# Patient Record
Sex: FEMALE | Race: WHITE | Employment: OTHER | URBAN - METROPOLITAN AREA
[De-identification: names, ages, dates, MRNs, and addresses within clinical notes are randomized per-mention and may not be internally consistent; named-entity substitution may affect disease eponyms.]

---

## 2021-03-02 ENCOUNTER — IMMUNIZATION (OUTPATIENT)
Dept: PRIMARY CARE CLINIC | Age: 86
End: 2021-03-02
Payer: MEDICARE

## 2021-03-02 PROCEDURE — 91301 COVID-19, MODERNA VACCINE 100MCG/0.5ML DOSE: CPT | Performed by: NURSE PRACTITIONER

## 2021-03-02 PROCEDURE — 0011A COVID-19, MODERNA VACCINE 100MCG/0.5ML DOSE: CPT | Performed by: NURSE PRACTITIONER

## 2021-03-30 ENCOUNTER — IMMUNIZATION (OUTPATIENT)
Dept: PRIMARY CARE CLINIC | Age: 86
End: 2021-03-30
Payer: MEDICARE

## 2021-03-30 PROCEDURE — 0012A COVID-19, MODERNA VACCINE 100MCG/0.5ML DOSE: CPT | Performed by: NURSE PRACTITIONER

## 2021-03-30 PROCEDURE — 91301 COVID-19, MODERNA VACCINE 100MCG/0.5ML DOSE: CPT | Performed by: NURSE PRACTITIONER

## 2022-07-03 ENCOUNTER — HOSPITAL ENCOUNTER (EMERGENCY)
Age: 87
Discharge: HOME | End: 2022-07-03
Payer: MEDICARE

## 2022-07-03 VITALS
DIASTOLIC BLOOD PRESSURE: 73 MMHG | SYSTOLIC BLOOD PRESSURE: 130 MMHG | OXYGEN SATURATION: 99 % | RESPIRATION RATE: 18 BRPM | HEIGHT: 63 IN | BODY MASS INDEX: 21.26 KG/M2 | HEART RATE: 81 BPM | TEMPERATURE: 97.7 F | WEIGHT: 120 LBS

## 2022-07-03 DIAGNOSIS — S81.812A SKIN TEAR OF LEFT LOWER LEG WITHOUT COMPLICATION, INITIAL ENCOUNTER: Primary | ICD-10-CM

## 2022-07-03 PROCEDURE — 6360000002 HC RX W HCPCS: Performed by: PHYSICIAN ASSISTANT

## 2022-07-03 PROCEDURE — 99212 OFFICE O/P EST SF 10 MIN: CPT

## 2022-07-03 PROCEDURE — 12002 RPR S/N/AX/GEN/TRNK2.6-7.5CM: CPT

## 2022-07-03 PROCEDURE — 90471 IMMUNIZATION ADMIN: CPT | Performed by: PHYSICIAN ASSISTANT

## 2022-07-03 PROCEDURE — 90715 TDAP VACCINE 7 YRS/> IM: CPT | Performed by: PHYSICIAN ASSISTANT

## 2022-07-03 RX ADMIN — TETANUS TOXOID, REDUCED DIPHTHERIA TOXOID AND ACELLULAR PERTUSSIS VACCINE, ADSORBED 0.5 ML: 5; 2.5; 8; 8; 2.5 SUSPENSION INTRAMUSCULAR at 18:01

## 2022-07-03 ASSESSMENT — PAIN SCALES - GENERAL: PAINLEVEL_OUTOF10: 2

## 2022-07-03 ASSESSMENT — PAIN - FUNCTIONAL ASSESSMENT: PAIN_FUNCTIONAL_ASSESSMENT: 0-10

## 2022-07-03 ASSESSMENT — PAIN DESCRIPTION - DESCRIPTORS: DESCRIPTORS: SORE

## 2022-07-03 NOTE — ED PROVIDER NOTES
[address] [facility]  Department of Emergency Medicine  UC Encounter Note  7/3/22   5:11 PM EDT      NAME: Graciela Correia  :  1926  MRN:  [id number]    Chief Complaint: Laceration (hit leg with botto of car door left lower leg   skin tear )      This is a 55-year-old female the presents to urgent care complaining of a skin tear to the left shin area. She states she was opening up her car door and hit her leg. She denies any other limb injury. She states she is walking fine. She denies any hip knee or ankle injury. She is unsure of her last tetanus and she wants another tetanus shot. On first contact patient she appears to be in no acute distress. She denies any numbness or tingling. Review of Systems  Pertinent positives and negatives are stated within HPI, all other systems reviewed and are negative. Physical Exam  Vitals and nursing note reviewed. Constitutional:       Appearance: She is well-developed. HENT:      Head: Normocephalic and atraumatic. Right Ear: Hearing and external ear normal.      Left Ear: Hearing and external ear normal.      Nose: Nose normal.      Mouth/Throat:      Pharynx: Uvula midline. Eyes:      General: Lids are normal.      Conjunctiva/sclera: Conjunctivae normal.      Pupils: Pupils are equal, round, and reactive to light. Cardiovascular:      Rate and Rhythm: Normal rate and regular rhythm. Heart sounds: Normal heart sounds. No murmur heard. Pulmonary:      Effort: Pulmonary effort is normal.      Breath sounds: Normal breath sounds. Abdominal:      General: Bowel sounds are normal.      Palpations: Abdomen is soft. Abdomen is not rigid. Tenderness: There is no abdominal tenderness. There is no guarding or rebound. Musculoskeletal:      Cervical back: Normal range of motion and neck supple. Comments: Gait is steady. She has no trouble walking.   I do not appreciate any bony pain to her tibia or her knee or ankle area. No hip or upper thigh pain. Skin:     General: Skin is warm and dry. Findings: Wound present. No abrasion or rash. Comments: Approximate 4.5 cm long skin tear mid left shin. Bleeding is controlled. I do not appreciate any bony pain. Neurological:      General: No focal deficit present. Mental Status: She is alert and oriented to person, place, and time. GCS: GCS eye subscore is 4. GCS verbal subscore is 5. GCS motor subscore is 6. Cranial Nerves: No cranial nerve deficit. Sensory: No sensory deficit. Coordination: Coordination normal.      Gait: Gait normal.         Procedures    MDM  Number of Diagnoses or Management Options  Skin tear of left lower leg without complication, initial encounter  Diagnosis management comments: I did clean the site with normal saline. I was able to reapproximate the folded skin back and place it over the skin tear. I did use for Steri-Strips. Wound care instructions were given I did update her tetanus. --------------------------------------------- PAST HISTORY ---------------------------------------------  Past Medical History:  has a past medical history of Arthritis, Hyperlipidemia, Hypertension, and Thyroid disease. Past Surgical History:  has no past surgical history on file. Social History:  reports that she has never smoked. She has never used smokeless tobacco. She reports that she does not drink alcohol and does not use drugs. Family History: family history is not on file. The patients home medications have been reviewed. Allergies: Morphine and Sulfa antibiotics    -------------------------------------------------- RESULTS -------------------------------------------------  No results found for this visit on 07/03/22.   No orders to display       ------------------------- NURSING NOTES AND VITALS REVIEWED ---------------------------   The nursing notes within the ED encounter and vital signs as below have been reviewed. /73   Pulse 81   Temp 97.7 °F (36.5 °C)   Resp 18   Ht 5' 3\" (1.6 m)   Wt 120 lb (54.4 kg)   SpO2 99%   BMI 21.26 kg/m²   Oxygen Saturation Interpretation: Normal      ------------------------------------------ PROGRESS NOTES ------------------------------------------   I have spoken with the patient and discussed todays results, in addition to providing specific details for the plan of care and counseling regarding the diagnosis and prognosis. Their questions are answered at this time and they are agreeable with the plan.      --------------------------------- ADDITIONAL PROVIDER NOTES ---------------------------------     This patient is stable for discharge. I have shared the specific conditions for return, as well as the importance of follow-up. * NOTE: This report was transcribed using voice recognition software. Every effort was made to ensure accuracy; however, inadvertent computerized transcription errors may be present.    --------------------------------- IMPRESSION AND DISPOSITION ---------------------------------    IMPRESSION  1.  Skin tear of left lower leg without complication, initial encounter        DISPOSITION  Disposition: Discharge to home  Patient condition is good            Jeane Martinez PA-C  07/03/22 [id number]

## 2022-07-11 ENCOUNTER — HOSPITAL ENCOUNTER (EMERGENCY)
Age: 87
Discharge: HOME | End: 2022-07-11
Payer: MEDICARE

## 2022-07-11 VITALS
BODY MASS INDEX: 21.26 KG/M2 | SYSTOLIC BLOOD PRESSURE: 147 MMHG | HEART RATE: 68 BPM | OXYGEN SATURATION: 96 % | TEMPERATURE: 97.7 F | DIASTOLIC BLOOD PRESSURE: 74 MMHG | WEIGHT: 120 LBS | RESPIRATION RATE: 18 BRPM | HEIGHT: 63 IN

## 2022-07-11 DIAGNOSIS — Z51.89 VISIT FOR WOUND CHECK: Primary | ICD-10-CM

## 2022-07-11 PROCEDURE — 99211 OFF/OP EST MAY X REQ PHY/QHP: CPT

## 2022-07-11 ASSESSMENT — PAIN - FUNCTIONAL ASSESSMENT: PAIN_FUNCTIONAL_ASSESSMENT: 0-10

## 2022-07-11 ASSESSMENT — PAIN SCALES - GENERAL: PAINLEVEL_OUTOF10: 0

## 2022-07-11 NOTE — ED PROVIDER NOTES
on the left lower extremity Steri-Strips are intact, there is no surrounding erythema there is no drainage there is no signs of infection. · Lymphatic: no lymphadenopathy noted  · Neurologic: GCS 15, no focal deficits,   · Psychiatric: Normal Affect    Lab / Imaging Results   (All laboratory and radiology results have been personally reviewed by myself)  Labs:  No results found for this visit on 07/11/22. Imaging: All Radiology results interpreted by Radiologist unless otherwise noted. No orders to display       ED Course / Medical Decision Making   Medications - No data to display       Consult(s):   None      MDM:   Wound appears to be healing well. The Steri-Strips are intact. I did tell her that she does not really need to have a wrap dressing over it since the Steri-Strips are covering the wound but she does want it reapplied. The nurse did reapply it I did tell her if it falls down again that she can just leave it open. Assessment      1. Visit for wound check      Plan   Discharge to home and advised to contact Miriam Hahn [phone]    Schedule an appointment as soon as possible for a visit      Patient condition is good    New Medications     New Prescriptions    No medications on file     Electronically signed by EPI Umanzor CNP   DD: 7/11/22  **This report was transcribed using voice recognition software. Every effort was made to ensure accuracy; however, inadvertent computerized transcription errors may be present.   END OF ED PROVIDER NOTE     EPI Umanzor CNP  07/11/22 1134

## 2023-04-26 ENCOUNTER — APPOINTMENT (OUTPATIENT)
Dept: GENERAL RADIOLOGY | Age: 88
End: 2023-04-26
Payer: MEDICARE

## 2023-04-26 ENCOUNTER — HOSPITAL ENCOUNTER (EMERGENCY)
Age: 88
Discharge: HOME | End: 2023-04-26
Payer: MEDICARE

## 2023-04-26 VITALS
BODY MASS INDEX: 20.2 KG/M2 | WEIGHT: 114 LBS | RESPIRATION RATE: 20 BRPM | HEART RATE: 79 BPM | OXYGEN SATURATION: 98 % | DIASTOLIC BLOOD PRESSURE: 65 MMHG | SYSTOLIC BLOOD PRESSURE: 127 MMHG | HEIGHT: 63 IN | TEMPERATURE: 98 F

## 2023-04-26 DIAGNOSIS — N39.0 URINARY TRACT INFECTION WITHOUT HEMATURIA, SITE UNSPECIFIED: ICD-10-CM

## 2023-04-26 DIAGNOSIS — M54.50 ACUTE LOW BACK PAIN WITHOUT SCIATICA, UNSPECIFIED BACK PAIN LATERALITY: Primary | ICD-10-CM

## 2023-04-26 LAB
BACTERIA URNS QL MICRO: ABNORMAL /HPF
BILIRUB UR QL STRIP: NEGATIVE
CLARITY UR: CLEAR
COLOR UR: YELLOW
GLUCOSE UR STRIP-MCNC: NEGATIVE MG/DL
HGB UR QL STRIP: NEGATIVE
KETONES UR STRIP-MCNC: ABNORMAL MG/DL
LEUKOCYTE ESTERASE UR QL STRIP: ABNORMAL
NITRITE UR QL STRIP: NEGATIVE
PH UR STRIP: 5.5 [PH] (ref 5–9)
PROT UR STRIP-MCNC: 30 MG/DL
RBC #/AREA URNS HPF: ABNORMAL /HPF (ref 0–2)
SP GR UR STRIP: 1.02 (ref 1–1.03)
UROBILINOGEN UR STRIP-ACNC: 0.2 E.U./DL
WBC #/AREA URNS HPF: ABNORMAL /HPF (ref 0–5)

## 2023-04-26 PROCEDURE — 99211 OFF/OP EST MAY X REQ PHY/QHP: CPT

## 2023-04-26 PROCEDURE — 81001 URINALYSIS AUTO W/SCOPE: CPT

## 2023-04-26 PROCEDURE — 72072 X-RAY EXAM THORAC SPINE 3VWS: CPT

## 2023-04-26 PROCEDURE — 72100 X-RAY EXAM L-S SPINE 2/3 VWS: CPT

## 2023-04-26 PROCEDURE — 87088 URINE BACTERIA CULTURE: CPT

## 2023-04-26 RX ORDER — CEPHALEXIN 500 MG/1
500 CAPSULE ORAL 2 TIMES DAILY
Qty: 14 CAPSULE | Refills: 0 | Status: SHIPPED | OUTPATIENT
Start: 2023-04-26 | End: 2023-05-03

## 2023-04-26 ASSESSMENT — PAIN SCALES - GENERAL: PAINLEVEL_OUTOF10: 9

## 2023-04-26 ASSESSMENT — PAIN DESCRIPTION - DESCRIPTORS: DESCRIPTORS: TENDER;DISCOMFORT

## 2023-04-26 ASSESSMENT — PAIN - FUNCTIONAL ASSESSMENT: PAIN_FUNCTIONAL_ASSESSMENT: 0-10

## 2023-04-26 ASSESSMENT — PAIN DESCRIPTION - LOCATION: LOCATION: BACK

## 2023-04-26 NOTE — ED PROVIDER NOTES
[address] ENCOUNTER        Pt Name: Lori Seaman  MRN: [id number]  [place] 6/17/1926  Date of evaluation: 4/26/2023  Provider: EPI Crabtree - [facility]  PCP: Ari Lovell MD  Note Started: 1:33 PM EDT 4/26/23    CHIEF COMPLAINT       Chief Complaint   Patient presents with    Back Pain     States she has had back pain for awhile and it moves to different parts of her back-she called her PCP and he said to come here and get an x-ray        HISTORY OF PRESENT ILLNESS: 1 or more Elements   History From: patient    Limitations to history : None    Lori Seaman is a 80 y.o. female who presents evaluation. She said since Sunday which was 4 days ago she started having pain in the mid and low back that is different than her usual back pain. She said she always has back pain but she said this is different. She has urinary frequency all the time to but does not have any specific complaint of dysuria or fever. She said there was no fall there was no injury she is denying any numbness or tingling in her arms or legs denying any weakness. Nursing Notes were all reviewed and agreed with or any disagreements were addressed in the HPI. REVIEW OF SYSTEMS :      Review of Systems    Positives and Pertinent negatives as per HPI. SURGICAL HISTORY   History reviewed. No pertinent surgical history. CURRENTMEDICATIONS       Previous Medications    ACETAMINOPHEN (TYLENOL EX ST ARTHRITIS PAIN) 500 MG TABLET    Take 500 mg by mouth every 6 hours as needed. ASPIRIN 81 MG CHEWABLE TABLET    Take 81 mg by mouth daily. HYDROCHLOROTHIAZIDE (HYDRODIURIL) 25 MG TABLET    Take 25 mg by mouth daily. LATANOPROST (XALATAN) 0.005 % OPHTHALMIC SOLUTION    1 drop nightly. LEVOTHYROXINE (SYNTHROID) 88 MCG TABLET    Take 88 mcg by mouth Daily. LOSARTAN (COZAAR) 100 MG TABLET    Take 100 mg by mouth daily. MULTIPLE VITAMINS-MINERALS (CENTRUM PO)    Take  by mouth.

## 2023-04-26 NOTE — DISCHARGE INSTRUCTIONS
Try tylenol for your back pain and biofreeze-- which is an ointment to relieve pain -- these are available in the pharmacy

## 2023-04-29 LAB — BACTERIA UR CULT: NORMAL
